# Patient Record
Sex: FEMALE | Race: WHITE | NOT HISPANIC OR LATINO | Employment: FULL TIME | URBAN - METROPOLITAN AREA
[De-identification: names, ages, dates, MRNs, and addresses within clinical notes are randomized per-mention and may not be internally consistent; named-entity substitution may affect disease eponyms.]

---

## 2022-02-11 ENCOUNTER — VBI (OUTPATIENT)
Dept: ADMINISTRATIVE | Facility: OTHER | Age: 42
End: 2022-02-11

## 2022-02-11 NOTE — TELEPHONE ENCOUNTER
Upon review of the In Basket request we were able to locate, review, and update the patient chart as requested for Pap Smear (HPV) aka Cervical Cancer Screening  Any additional questions or concerns should be emailed to the Practice Liaisons via [email or web address] email, please do not reply via In Basket      Thank you  Yamileth Manzanares

## 2022-02-23 ENCOUNTER — OFFICE VISIT (OUTPATIENT)
Dept: OBGYN CLINIC | Facility: CLINIC | Age: 42
End: 2022-02-23
Payer: COMMERCIAL

## 2022-02-23 VITALS
DIASTOLIC BLOOD PRESSURE: 60 MMHG | HEIGHT: 68 IN | BODY MASS INDEX: 23.98 KG/M2 | SYSTOLIC BLOOD PRESSURE: 100 MMHG | WEIGHT: 158.2 LBS

## 2022-02-23 DIAGNOSIS — Z80.0 FH: COLON CANCER: ICD-10-CM

## 2022-02-23 DIAGNOSIS — Z12.4 ENCOUNTER FOR PAPANICOLAOU SMEAR FOR CERVICAL CANCER SCREENING: ICD-10-CM

## 2022-02-23 DIAGNOSIS — N64.4 BREAST PAIN: ICD-10-CM

## 2022-02-23 DIAGNOSIS — Z12.31 BREAST CANCER SCREENING BY MAMMOGRAM: ICD-10-CM

## 2022-02-23 DIAGNOSIS — Z01.419 ENCOUNTER FOR GYNECOLOGICAL EXAMINATION WITHOUT ABNORMAL FINDING: Primary | ICD-10-CM

## 2022-02-23 DIAGNOSIS — R10.2 PELVIC PAIN: ICD-10-CM

## 2022-02-23 DIAGNOSIS — F41.9 ANXIETY: ICD-10-CM

## 2022-02-23 PROCEDURE — S0610 ANNUAL GYNECOLOGICAL EXAMINA: HCPCS | Performed by: NURSE PRACTITIONER

## 2022-02-23 NOTE — PATIENT INSTRUCTIONS
Calcium 1000 mg + 600-1000 IU Vit D daily  Pap with high risk HPV Q 3-5 years, Done today   Annual mammogram, monthly breast self exam    Exercise 150 minutes per week minimum  COlonoscopy at 39

## 2022-02-23 NOTE — PROGRESS NOTES
Assessment/Plan:  Calcium 1000 mg + 600-1000 IU Vit D daily  Pap with high risk HPV Q 3-5 years, Done today   Annual mammogram, monthly breast self exam    Exercise 150 minutes per week minimum  COlonoscopy at 39        Diagnoses and all orders for this visit:    Encounter for gynecological examination without abnormal finding  -     IGP, Aptima HPV, Rfx 16/18,45    Breast cancer screening by mammogram  -     Mammo screening bilateral w 3d & cad; Future    Encounter for Papanicolaou smear for cervical cancer screening  -     IGP, Aptima HPV, Rfx 16/18,45    FH: colon cancer  Comments:  dad 62s    Anxiety  Comments:  worse right before period    Pelvic pain  Comments:  consider hormonal IUD for management    Breast pain  Comments:  intermittent left latera left breast No palp abnormality Offered dx mammo and US declined Monitor call if worsens          Subjective:      Patient ID: Tabatha Sanabria is a 39 y o  female  Old/NP here for annual gyn PAP 6/2018 neg/neg No h/o abn pap Pelvic pain/pain with ovulation since child born 10 years ago  Unable to take OCP due to Migraine with Aura Periods monthly a lot of cramping with period Not interested in IUD No pain other times of month  Bowel and bladder are normal  FH colon cancer dad in 62s Has not yet had colonoscopy  Due for mammo  Has had some intermittent tenderness left lateral left breast  No palp abnormality @ cup caffeine has stopped as increased anxiety Has had some Panic attacks  Did counseling years ago  The following portions of the patient's history were reviewed and updated as appropriate: allergies, current medications, past family history, past medical history, past social history, past surgical history and problem list     Review of Systems   Constitutional: Negative for fatigue and unexpected weight change  Gastrointestinal: Negative for abdominal distention, abdominal pain, constipation and diarrhea     Genitourinary: Positive for menstrual problem and pelvic pain  Negative for difficulty urinating, dyspareunia, dysuria, frequency, genital sores, urgency, vaginal bleeding, vaginal discharge and vaginal pain  Neurological: Negative for headaches  Psychiatric/Behavioral: Negative for dysphoric mood  The patient is nervous/anxious  Anxiety, PMS day before period occasional panic attack         Objective:      /60 (BP Location: Left arm, Patient Position: Sitting, Cuff Size: Large)   Ht 5' 8 25" (1 734 m)   Wt 71 8 kg (158 lb 3 2 oz)   LMP 02/14/2022 (Exact Date)   Breastfeeding No   BMI 23 88 kg/m²          Physical Exam  Vitals and nursing note reviewed  Constitutional:       General: She is not in acute distress  Appearance: Normal appearance  HENT:      Head: Normocephalic and atraumatic  Pulmonary:      Effort: Pulmonary effort is normal    Chest:   Breasts: Breasts are symmetrical       Right: Normal  No mass, nipple discharge, skin change, tenderness or axillary adenopathy  Left: Normal  No mass, nipple discharge, skin change, tenderness or axillary adenopathy  Abdominal:      General: There is no distension  Palpations: Abdomen is soft  Tenderness: There is no abdominal tenderness  There is no guarding or rebound  Genitourinary:     General: Normal vulva  Exam position: Lithotomy position  Labia:         Right: No rash, tenderness, lesion or injury  Left: No rash, tenderness, lesion or injury  Urethra: No prolapse, urethral pain, urethral swelling or urethral lesion  Vagina: Normal  No erythema or lesions  Cervix: No cervical motion tenderness, discharge, lesion or cervical bleeding  Uterus: Normal        Adnexa: Right adnexa normal and left adnexa normal         Right: No mass or tenderness  Left: No mass or tenderness  Rectum: No mass or external hemorrhoid        Comments: PAP from cervix  Musculoskeletal:         General: Normal range of motion  Lymphadenopathy:      Upper Body:      Right upper body: No axillary adenopathy  Left upper body: No axillary adenopathy  Lower Body: No right inguinal adenopathy  No left inguinal adenopathy  Skin:     General: Skin is warm and dry  Neurological:      Mental Status: She is alert and oriented to person, place, and time  Psychiatric:         Mood and Affect: Mood normal          Behavior: Behavior normal          Thought Content:  Thought content normal          Judgment: Judgment normal

## 2022-02-27 LAB
CYTOLOGIST CVX/VAG CYTO: NORMAL
DX ICD CODE: NORMAL
HPV I/H RISK 4 DNA CVX QL PROBE+SIG AMP: NEGATIVE
OTHER STN SPEC: NORMAL
PATH REPORT.FINAL DX SPEC: NORMAL
SL AMB NOTE:: NORMAL
SL AMB SPECIMEN ADEQUACY: NORMAL
SL AMB TEST METHODOLOGY: NORMAL

## 2024-03-13 ENCOUNTER — OFFICE VISIT (OUTPATIENT)
Dept: OBGYN CLINIC | Facility: CLINIC | Age: 44
End: 2024-03-13
Payer: COMMERCIAL

## 2024-03-13 VITALS
BODY MASS INDEX: 24.86 KG/M2 | WEIGHT: 164 LBS | SYSTOLIC BLOOD PRESSURE: 108 MMHG | HEIGHT: 68 IN | DIASTOLIC BLOOD PRESSURE: 70 MMHG

## 2024-03-13 DIAGNOSIS — Z12.31 ENCOUNTER FOR SCREENING MAMMOGRAM FOR MALIGNANT NEOPLASM OF BREAST: ICD-10-CM

## 2024-03-13 DIAGNOSIS — Z01.419 ENCNTR FOR GYN EXAM (GENERAL) (ROUTINE) W/O ABN FINDINGS: Primary | ICD-10-CM

## 2024-03-13 PROCEDURE — 99396 PREV VISIT EST AGE 40-64: CPT | Performed by: OBSTETRICS & GYNECOLOGY

## 2024-03-13 NOTE — PROGRESS NOTES
Annual Wellness Visit  [facility] - [address], [address], [place] [address]    ASSESSMENT/PLAN: Katie Stephen is a 43 y.o.  who presents for annual gynecologic exam.    Encounter for routine gynecologic examination  - Routine well woman exam completed today.  - Cervical Cancer Screening: Current ASCCP Guidelines reviewed. Last Pap: 2022 . Next Pap Due: 3-5 yrs, routine.  - STI screening offered including HIV testing: offered, pt declined  - Contraceptive counseling discussed.  Current contraception: condoms  - Breast Cancer Screening: Last Mammogram Not on file,   - The following were reviewed in today's visit: breast self exam    Additional problems addressed during this visit:  1. Encntr for gyn exam (general) (routine) w/o abn findings    2. Encounter for screening mammogram for malignant neoplasm of breast  -     Mammo screening bilateral w 3d & cad; Future        Next visit: 1 yr      CC:  Annual Gynecologic Examination    HPI: Katie Stephen is a 43 y.o.  who presents for annual gynecologic examination.  Patient presents for Gyn exam.  Denies having any concerns        Gyn History  Patient's last menstrual period was 2024.     Last Pap: 2022 was normal    She  reports being sexually active and has had partner(s) who are male. She reports using the following method of birth control/protection: Condom Male.       OB History      Past Medical History:  No date: Migraine with aura  2012: Miscarriage  No date: Ovarian cyst  No date: Varicella      Comment:  As a kid     Past Surgical History:  2017: CHOLECYSTECTOMY  No date: DILATION AND EVACUATION     Family History   Problem Relation Age of Onset    Other Mother         Hysterectomy for benign indication.    Migraines Mother     Miscarriages / Stillbirths Mother     Colon cancer Father     Cancer Father         Colon cancer    Stroke Father     Heart attack Maternal Grandmother      "    Social History     Tobacco Use    Smoking status: Never    Smokeless tobacco: Never   Substance Use Topics    Alcohol use: Not Currently     Comment: rarely special occasion    Drug use: Never        No current outpatient medications on file.    She is allergic to red dye - food allergy..    ROS negative except as noted in HPI    Objective:  /70 (BP Location: Left arm, Patient Position: Sitting, Cuff Size: Standard)   Ht 5' 8.25\" (1.734 m)   Wt 74.4 kg (164 lb)   LMP 03/02/2024   BMI 24.75 kg/m²      Physical Exam  Vitals and nursing note reviewed.   HENT:      Head: Normocephalic.   Chest:   Breasts:     Breasts are symmetrical.      Right: Normal. No bleeding, mass, nipple discharge, skin change or tenderness.      Left: Normal. No bleeding, mass, nipple discharge, skin change or tenderness.   Abdominal:      General: There is no distension.      Palpations: Abdomen is soft. There is no mass.      Tenderness: There is no abdominal tenderness. There is no rebound.   Genitourinary:     General: Normal vulva.      Exam position: Lithotomy position.      Labia:         Right: No rash, tenderness or lesion.         Left: No rash, tenderness or lesion.       Urethra: No urethral pain or urethral lesion.      Vagina: Normal. No vaginal discharge.      Cervix: No discharge, friability, lesion or erythema.      Uterus: Normal.       Adnexa: Right adnexa normal and left adnexa normal.      Rectum: No external hemorrhoid.   Musculoskeletal:      Right lower leg: No edema.      Left lower leg: No edema.   Lymphadenopathy:      Upper Body:      Right upper body: No axillary or pectoral adenopathy.      Left upper body: No axillary or pectoral adenopathy.   Skin:     General: Skin is warm.   Neurological:      Mental Status: She is alert and oriented to person, place, and time.   Psychiatric:         Mood and Affect: Mood normal.         Behavior: Behavior normal.         Thought Content: Thought content normal. "

## 2025-05-07 ENCOUNTER — ANNUAL EXAM (OUTPATIENT)
Dept: OBGYN CLINIC | Facility: CLINIC | Age: 45
End: 2025-05-07
Payer: COMMERCIAL

## 2025-05-07 VITALS
WEIGHT: 163.2 LBS | SYSTOLIC BLOOD PRESSURE: 102 MMHG | DIASTOLIC BLOOD PRESSURE: 62 MMHG | BODY MASS INDEX: 24.74 KG/M2 | HEIGHT: 68 IN

## 2025-05-07 DIAGNOSIS — Z86.69 HISTORY OF MIGRAINE WITH AURA: ICD-10-CM

## 2025-05-07 DIAGNOSIS — Z30.09 ENCOUNTER FOR OTHER GENERAL COUNSELING OR ADVICE ON CONTRACEPTION: ICD-10-CM

## 2025-05-07 DIAGNOSIS — N94.6 DYSMENORRHEA: ICD-10-CM

## 2025-05-07 DIAGNOSIS — N94.0 OVULATION PAIN: ICD-10-CM

## 2025-05-07 DIAGNOSIS — Z12.31 ENCOUNTER FOR SCREENING MAMMOGRAM FOR MALIGNANT NEOPLASM OF BREAST: ICD-10-CM

## 2025-05-07 DIAGNOSIS — Z01.411 ENCNTR FOR GYN EXAM (GENERAL) (ROUTINE) W ABNORMAL FINDINGS: Primary | ICD-10-CM

## 2025-05-07 PROCEDURE — 99396 PREV VISIT EST AGE 40-64: CPT | Performed by: OBSTETRICS & GYNECOLOGY

## 2025-05-07 PROCEDURE — 99214 OFFICE O/P EST MOD 30 MIN: CPT | Performed by: OBSTETRICS & GYNECOLOGY

## 2025-05-07 RX ORDER — ACETAMINOPHEN AND CODEINE PHOSPHATE 120; 12 MG/5ML; MG/5ML
1 SOLUTION ORAL DAILY
Qty: 84 TABLET | Refills: 3 | Status: SHIPPED | OUTPATIENT
Start: 2025-05-07 | End: 2026-04-08

## 2025-05-07 NOTE — PROGRESS NOTES
Annual Wellness Visit  [facility] - [address], [address], [place] [address]    ASSESSMENT/PLAN: Katie Stephen is a 44 y.o.  who presents for annual gynecologic exam.    Encounter for routine gynecologic examination  - Routine well woman exam completed today.  - Cervical Cancer Screening: Current ASCCP Guidelines reviewed. Last Pap: 2022. Past abnormal pap: none.  Next Pap Due: .  - STI screening offered including HIV testing: offered, pt declined  - Contraceptive counseling discussed.  Current contraception: condoms  - Breast Cancer Screening: Last Mammogram Not on file, done  at [facility]  - The following were reviewed in today's visit: breast self exam, mammography screening ordered, and colonoscopy guideline discussed     Additional problems addressed during this visit:  1. Encntr for gyn exam (general) (routine) w abnormal findings  2. Encounter for screening mammogram for malignant neoplasm of breast  -     Mammo screening bilateral w 3d and cad; Future  3. Dysmenorrhea  -  management with Motrin/Advil or Progesterone only contraception (Depo-Provera, Nexplanon, Liletta/Mirena or OCPs) reviewed  -   R&B, indications and mode of administration for each reviewed.  -  Pt desires progesterone only OCPs    norethindrone (Lois) 0.35 MG tablet; Take 1 tablet (0.35 mg total) by mouth daily  4. Ovulation pain  -  ovulation suppression with OCPs reviewed\-     norethindrone (Lois) 0.35 MG tablet; Take 1 tabl  et (0.35 mg total) by mouth daily  5. Encounter for other general counseling or advice on contraception  -all Progesterone only contraception (Depo-Provera, Nexplanon, Liletta/Mirena or OCPs) reviewed  -   R&B, indications and mode of administration for each reviewed.  -  Pt desires progesterone only OCPs  -     norethindrone (Lois) 0.35 MG tablet; Take 1 tablet (0.35 mg total) by mouth daily  6. History of migraine with aura        - estrogen containing  contraception not recommended    I have spent a total time of 45 minutes (15 mins on Gyn wellness, 30 minutes on addressing additional problems) in caring for this patient on the day of the visit/encounter including Prognosis, Risks and benefits of tx options, Instructions for management, Patient and family education, Importance of tx compliance, Risk factor reductions, Impressions, Counseling / Coordination of care, Documenting in the medical record, Reviewing/placing orders in the medical record (including tests, medications, and/or procedures), and Obtaining or reviewing history  .     Next visit: 1 yr WA, 3 mos OCP follow up      CC:  Annual Gynecologic Examination    HPI: Katie Stephen is a 44 y.o.  who presents for annual gynecologic examination.  Pt presents for Gyn wellness exam. Pt also concerned about dysmenorrhea and pelvic discomfort with ovulation.      Gynecologic Exam        Gyn History  Patient's last menstrual period was 2025.     Last Pap: 2022 was normal    She  reports being sexually active and has had partner(s) who are male. She reports using the following method of birth control/protection: Condom Male.       OB History      Past Medical History:  No date: Migraine with aura  2012: Miscarriage  No date: Ovarian cyst  No date: Varicella      Comment:  As a kid     Past Surgical History:  2017: CHOLECYSTECTOMY  No date: DILATION AND EVACUATION     Family History   Problem Relation Age of Onset    Other Mother         Hysterectomy for benign indication.    Migraines Mother     Miscarriages / Stillbirths Mother     Colon cancer Father     Cancer Father         Colon cancer    Stroke Father     Heart attack Maternal Grandmother         Social History     Tobacco Use    Smoking status: Never    Smokeless tobacco: Never   Vaping Use    Vaping status: Never Used   Substance Use Topics    Alcohol use: Not Currently     Comment: rarely special occasion    Drug use: Never     "      Current Outpatient Medications:     norethindrone (Lois) 0.35 MG tablet, Take 1 tablet (0.35 mg total) by mouth daily, Disp: 84 tablet, Rfl: 3    She is allergic to red dye - food allergy..    ROS negative except as noted in HPI    Objective:  /62 (BP Location: Left arm, Patient Position: Sitting, Cuff Size: Standard)   Ht 5' 8.25\" (1.734 m)   Wt 74 kg (163 lb 3.2 oz)   LMP 04/26/2025   BMI 24.63 kg/m²      Physical Exam  Vitals and nursing note reviewed.   HENT:      Head: Normocephalic.   Chest:   Breasts:     Breasts are symmetrical.      Right: Normal. No bleeding, mass, nipple discharge, skin change or tenderness.      Left: Normal. No bleeding, mass, nipple discharge, skin change or tenderness.   Abdominal:      General: There is no distension.      Palpations: Abdomen is soft. There is no mass.      Tenderness: There is no abdominal tenderness. There is no rebound.   Genitourinary:     General: Normal vulva.      Exam position: Lithotomy position.      Labia:         Right: No rash, tenderness or lesion.         Left: No rash, tenderness or lesion.       Urethra: No urethral pain or urethral lesion.      Vagina: Normal. No vaginal discharge.      Cervix: No discharge, friability, lesion or erythema.      Uterus: Normal.       Adnexa: Right adnexa normal and left adnexa normal.      Rectum: No external hemorrhoid.   Musculoskeletal:      Right lower leg: No edema.      Left lower leg: No edema.   Lymphadenopathy:      Upper Body:      Right upper body: No axillary or pectoral adenopathy.      Left upper body: No axillary or pectoral adenopathy.   Skin:     General: Skin is warm.   Neurological:      Mental Status: She is alert and oriented to person, place, and time.   Psychiatric:         Mood and Affect: Mood normal.         Behavior: Behavior normal.         Thought Content: Thought content normal.         "

## 2025-07-31 PROBLEM — R79.89 HIGH THYROID STIMULATING HORMONE (TSH) LEVEL: Status: ACTIVE | Noted: 2025-07-31

## 2025-08-13 ENCOUNTER — OFFICE VISIT (OUTPATIENT)
Dept: OBGYN CLINIC | Facility: CLINIC | Age: 45
End: 2025-08-13
Payer: COMMERCIAL